# Patient Record
Sex: MALE | Race: WHITE | URBAN - METROPOLITAN AREA
[De-identification: names, ages, dates, MRNs, and addresses within clinical notes are randomized per-mention and may not be internally consistent; named-entity substitution may affect disease eponyms.]

---

## 2020-12-07 ENCOUNTER — OFFICE VISIT (OUTPATIENT)
Dept: URBAN - METROPOLITAN AREA CLINIC 126 | Facility: CLINIC | Age: 25
End: 2020-12-07
Payer: COMMERCIAL

## 2020-12-07 ENCOUNTER — WEB ENCOUNTER (OUTPATIENT)
Dept: URBAN - METROPOLITAN AREA CLINIC 126 | Facility: CLINIC | Age: 25
End: 2020-12-07

## 2020-12-07 DIAGNOSIS — R19.7 DIARRHEA: ICD-10-CM

## 2020-12-07 PROCEDURE — G8482 FLU IMMUNIZE ORDER/ADMIN: HCPCS | Performed by: INTERNAL MEDICINE

## 2020-12-07 PROCEDURE — 99203 OFFICE O/P NEW LOW 30 MIN: CPT | Performed by: INTERNAL MEDICINE

## 2020-12-07 RX ORDER — DICYCLOMINE HYDROCHLORIDE 20 MG/1
1 TABLET TABLET ORAL THREE TIMES A DAY
Qty: 90 | OUTPATIENT
Start: 2020-12-07 | End: 2021-01-06

## 2020-12-07 NOTE — HPI-TODAY'S VISIT:
goes by [facility]   for 5 yrs   abd cramps  5 stools a day  no blood  no family hx  wt down 8 pounds  very healthy eats well   goes to gym 5 times a week

## 2021-02-21 LAB
A/G RATIO: 1.8
ALBUMIN: 4.9
ALKALINE PHOSPHATASE: 122
ALT (SGPT): 31
AST (SGOT): 33
BASO (ABSOLUTE): 0.1
BASOS: 1
BILIRUBIN, TOTAL: 0.2
BUN/CREATININE RATIO: 18
BUN: 20
CALCIUM: 9.9
CARBON DIOXIDE, TOTAL: 23
CHLORIDE: 104
CREATININE: 1.09
DEAMIDATED GLIADIN ABS, IGA: 4
DEAMIDATED GLIADIN ABS, IGG: 5
EGFR IF AFRICN AM: 109
EGFR IF NONAFRICN AM: 94
ENDOMYSIAL ANTIBODY IGA: NEGATIVE
EOS (ABSOLUTE): 0.5
EOS: 6
GLOBULIN, TOTAL: 2.7
GLUCOSE: 96
HEMATOCRIT: 48
HEMATOLOGY COMMENTS:: (no result)
HEMOGLOBIN: 15.9
IMMATURE CELLS: (no result)
IMMATURE GRANS (ABS): 0
IMMATURE GRANULOCYTES: 0
IMMUNOGLOBULIN A, QN, SERUM: 128
LYMPHS (ABSOLUTE): 2.4
LYMPHS: 31
MCH: 29.6
MCHC: 33.1
MCV: 89
MONOCYTES(ABSOLUTE): 0.9
MONOCYTES: 11
NEUTROPHILS (ABSOLUTE): 4
NEUTROPHILS: 51
NRBC: (no result)
PLATELETS: 213
POTASSIUM: 4.1
PROTEIN, TOTAL: 7.6
RBC: 5.38
RDW: 12
SEDIMENTATION RATE-WESTERGREN: 15
SODIUM: 140
T-TRANSGLUTAMINASE (TTG) IGA: <2
T-TRANSGLUTAMINASE (TTG) IGG: 4
WBC: 7.9

## 2021-02-26 ENCOUNTER — LAB OUTSIDE AN ENCOUNTER (OUTPATIENT)
Dept: URBAN - METROPOLITAN AREA CLINIC 126 | Facility: CLINIC | Age: 26
End: 2021-02-26

## 2021-02-26 ENCOUNTER — OFFICE VISIT (OUTPATIENT)
Dept: URBAN - METROPOLITAN AREA CLINIC 126 | Facility: CLINIC | Age: 26
End: 2021-02-26
Payer: COMMERCIAL

## 2021-02-26 DIAGNOSIS — R19.7 DIARRHEA, UNSPECIFIED TYPE: ICD-10-CM

## 2021-02-26 DIAGNOSIS — R10.33 PERIUMBILICAL ABDOMINAL PAIN: ICD-10-CM

## 2021-02-26 PROBLEM — 443503005: Status: ACTIVE | Noted: 2021-02-26

## 2021-02-26 PROCEDURE — 99213 OFFICE O/P EST LOW 20 MIN: CPT | Performed by: INTERNAL MEDICINE

## 2021-02-26 NOTE — PHYSICAL EXAM GASTROINTESTINAL
Abdomen, soft, nontender, nondistended, no guarding or rigidity, no masses palpable, normal bowel sounds, Liver and Spleen, no hepatomegaly present, no hepatosplenomegaly, liver nontender, spleen not palpable, Self Exam: Abdomen soft, non-tender to palpatation, non-distended , Abdomen , soft, nontender, nondistended , no guarding or rigidity , no masses palpable , normal bowel sounds , Liver and Spleen , no hepatomegaly present , no hepatosplenomegaly , liver nontender , spleen not palpable

## 2021-02-26 NOTE — PHYSICAL EXAM CONSTITUTIONAL:
normal, alert, in no acute distress, well developed, well nourished, ambulating without difficulty, normal communication ability , well developed, well nourished , in no acute distress , ambulating without difficulty , normal communication ability

## 2021-02-26 NOTE — PHYSICAL EXAM CHEST:
no lesions, no deformities, no traumatic injuries, no significant scars are present, chest wall non-tender, no masses present, tactile fremitus is normal, breathing is unlabored without accessory muscle use., normal breath sounds. , no lesions,  no deformities,  no traumatic injuries,  no significant scars are present,  chest wall non-tender,  no masses present, breathing is unlabored without accessory muscle use, normal breath sounds

## 2021-02-26 NOTE — PHYSICAL EXAM NECK/THYROID:
normal appearance, without tenderness upon palpation, no deformities, no cervical lymphadenopathy, no masses, no thyroid nodules, Thyroid normal size, no JVD, thyroid nontender , normal appearance , without tenderness upon palpation , no deformities , trachea midline , Thyroid normal size , no thyroid nodules , no masses , no JVD , thyroid nontender

## 2021-02-26 NOTE — HPI-TODAY'S VISIT:
[facility] [employer]   works out and does fine there  but has had 4 bms already this am and more later  not liquid  in small pieces   watches diet  eats healthy  some wsxs sinc e high school  feels well now but sxs recur  need to rule out ibd  no hx in family

## 2021-03-25 ENCOUNTER — TELEPHONE ENCOUNTER (OUTPATIENT)
Dept: URBAN - METROPOLITAN AREA CLINIC 80 | Facility: CLINIC | Age: 26
End: 2021-03-25

## 2021-04-19 ENCOUNTER — OFFICE VISIT (OUTPATIENT)
Dept: URBAN - METROPOLITAN AREA SURGERY CENTER 19 | Facility: SURGERY CENTER | Age: 26
End: 2021-04-19

## 2021-04-26 ENCOUNTER — OFFICE VISIT (OUTPATIENT)
Dept: URBAN - METROPOLITAN AREA CLINIC 126 | Facility: CLINIC | Age: 26
End: 2021-04-26
Payer: COMMERCIAL

## 2021-04-26 DIAGNOSIS — K58.1 IRRITABLE BOWEL SYNDROME WITH CONSTIPATION: ICD-10-CM

## 2021-04-26 PROBLEM — 440630006: Status: ACTIVE | Noted: 2021-04-26

## 2021-04-26 PROCEDURE — 99213 OFFICE O/P EST LOW 20 MIN: CPT | Performed by: INTERNAL MEDICINE

## 2021-04-26 NOTE — HPI-TODAY'S VISIT:
feels terrible   pain in am  constipated  never evacuates fully   does not want a colon willing to try linzess

## 2021-08-20 ENCOUNTER — OFFICE VISIT (OUTPATIENT)
Dept: URBAN - METROPOLITAN AREA CLINIC 126 | Facility: CLINIC | Age: 26
End: 2021-08-20
Payer: COMMERCIAL

## 2021-08-20 DIAGNOSIS — R14.0 BLOATING SYMPTOM: ICD-10-CM

## 2021-08-20 PROCEDURE — 99213 OFFICE O/P EST LOW 20 MIN: CPT | Performed by: INTERNAL MEDICINE

## 2021-08-20 NOTE — PHYSICAL EXAM EYES:
Conjuntivae and eyelids appear normal, Sclerae : White without injection , Conjuntivae and eyelids appear normal, Sclerae : White without injection
Awake

## 2021-08-20 NOTE — HPI-TODAY'S VISIT:
bowels move normally  does not need linzess   feels ok but always bloated  wants to get food allergy testing

## 2022-02-18 ENCOUNTER — OFFICE VISIT (OUTPATIENT)
Dept: URBAN - METROPOLITAN AREA TELEHEALTH 2 | Facility: TELEHEALTH | Age: 27
End: 2022-02-18
Payer: COMMERCIAL

## 2022-02-18 VITALS — HEIGHT: 68 IN

## 2022-02-18 DIAGNOSIS — K92.1 BLOOD IN STOOL: ICD-10-CM

## 2022-02-18 DIAGNOSIS — R19.8 TENESMUS: ICD-10-CM

## 2022-02-18 DIAGNOSIS — R19.5 MUCUS IN STOOL: ICD-10-CM

## 2022-02-18 DIAGNOSIS — R14.0 BLOATING SYMPTOM: ICD-10-CM

## 2022-02-18 PROCEDURE — 99214 OFFICE O/P EST MOD 30 MIN: CPT | Performed by: INTERNAL MEDICINE

## 2022-02-18 NOTE — HPI-TODAY'S VISIT:
goes by [facility] dealing w/ GI issues lately few episodes with blood in stool, 2 weeks ago was the last episode been to a GI in [place], suggested endoscopic evaluation. was perscribed linzess for constiaption, did not help. at times get constipated, feels bloated flops from c/d. has daily stools, but some tenesmus mucus and blood. healthy, MVI. no longer taking stool softener. no famhx of IBD, celiac. try gluten free, carb free, egg free, has not helped. this has been going on for 7 years.  labs reviewed

## 2022-03-11 ENCOUNTER — OFFICE VISIT (OUTPATIENT)
Dept: URBAN - METROPOLITAN AREA SURGERY CENTER 19 | Facility: SURGERY CENTER | Age: 27
End: 2022-03-11
Payer: COMMERCIAL

## 2022-03-11 ENCOUNTER — CLAIMS CREATED FROM THE CLAIM WINDOW (OUTPATIENT)
Dept: URBAN - METROPOLITAN AREA CLINIC 4 | Facility: CLINIC | Age: 27
End: 2022-03-11
Payer: COMMERCIAL

## 2022-03-11 DIAGNOSIS — K63.89 SMALL BOWEL EDEMA: ICD-10-CM

## 2022-03-11 DIAGNOSIS — K92.1 ACUTE MELENA: ICD-10-CM

## 2022-03-11 DIAGNOSIS — K62.89 ACUTE PROCTITIS: ICD-10-CM

## 2022-03-11 PROCEDURE — G8907 PT DOC NO EVENTS ON DISCHARG: HCPCS | Performed by: INTERNAL MEDICINE

## 2022-03-11 PROCEDURE — 88305 TISSUE EXAM BY PATHOLOGIST: CPT | Performed by: PATHOLOGY

## 2022-03-11 PROCEDURE — 45331 SIGMOIDOSCOPY AND BIOPSY: CPT | Performed by: INTERNAL MEDICINE

## 2022-03-22 ENCOUNTER — TELEPHONE ENCOUNTER (OUTPATIENT)
Dept: URBAN - METROPOLITAN AREA CLINIC 96 | Facility: CLINIC | Age: 27
End: 2022-03-22

## 2023-06-09 ENCOUNTER — WEB ENCOUNTER (OUTPATIENT)
Dept: URBAN - METROPOLITAN AREA CLINIC 111 | Facility: CLINIC | Age: 28
End: 2023-06-09

## 2023-06-09 ENCOUNTER — OFFICE VISIT (OUTPATIENT)
Dept: URBAN - METROPOLITAN AREA CLINIC 111 | Facility: CLINIC | Age: 28
End: 2023-06-09
Payer: COMMERCIAL

## 2023-06-09 VITALS
HEART RATE: 56 BPM | SYSTOLIC BLOOD PRESSURE: 121 MMHG | BODY MASS INDEX: 25.31 KG/M2 | HEIGHT: 68 IN | WEIGHT: 167 LBS | DIASTOLIC BLOOD PRESSURE: 75 MMHG | TEMPERATURE: 98.1 F

## 2023-06-09 DIAGNOSIS — R19.8 ALTERNATING CONSTIPATION AND DIARRHEA: ICD-10-CM

## 2023-06-09 DIAGNOSIS — R10.9 ABDOMINAL CRAMPING: ICD-10-CM

## 2023-06-09 DIAGNOSIS — R14.0 BLOATING SYMPTOM: ICD-10-CM

## 2023-06-09 PROBLEM — 440544005: Status: ACTIVE | Noted: 2023-06-09

## 2023-06-09 PROCEDURE — 99213 OFFICE O/P EST LOW 20 MIN: CPT | Performed by: NURSE PRACTITIONER

## 2023-06-09 RX ORDER — L. ACIDOPHILUS,CASEI,RHAMNOSUS 50B CELL
AS DIRECTED CAPSULE,DELAYED RELEASE (ENTERIC COATED) ORAL
OUTPATIENT
Start: 2023-06-09

## 2023-06-09 RX ORDER — HYOSCYAMINE SULFATE 0.12 MG/1
1 TABLET UNDER THE TONGUE AND ALLOW TO DISSOLVE AS NEEDED TABLET, ORALLY DISINTEGRATING ORAL THREE TIMES A DAY
Qty: 30 | Refills: 1 | OUTPATIENT
Start: 2023-06-09 | End: 2023-06-29

## 2023-06-09 NOTE — HPI-TODAY'S VISIT:
29 yo male presents for follow up. Reports long history of abd bloating, alternating constipation and diarrheayarns Patient was last seen by Dr. Rg with few episodes of blood in stool, sigmoidoscopy 3/22 normal. He was seen by Dr. Lund few times since 2020. He eats healthy, exercises daily, takes Metamucil powder, tried dairy free, gluten free without relief. Has high stress at job currently. He didn't try low fodmap yet. Denies fever, chills, abd pain, nausea, vomiting or blood in stool. No fh IBD. Celiac panel negative.

## 2023-06-14 ENCOUNTER — TELEPHONE ENCOUNTER (OUTPATIENT)
Dept: URBAN - METROPOLITAN AREA CLINIC 12 | Facility: CLINIC | Age: 28
End: 2023-06-14

## 2023-06-22 ENCOUNTER — OFFICE VISIT (OUTPATIENT)
Dept: URBAN - METROPOLITAN AREA CLINIC 98 | Facility: CLINIC | Age: 28
End: 2023-06-22
Payer: COMMERCIAL

## 2023-06-22 ENCOUNTER — LAB OUTSIDE AN ENCOUNTER (OUTPATIENT)
Dept: URBAN - METROPOLITAN AREA CLINIC 98 | Facility: CLINIC | Age: 28
End: 2023-06-22

## 2023-06-22 ENCOUNTER — WEB ENCOUNTER (OUTPATIENT)
Dept: URBAN - METROPOLITAN AREA CLINIC 98 | Facility: CLINIC | Age: 28
End: 2023-06-22

## 2023-06-22 ENCOUNTER — DASHBOARD ENCOUNTERS (OUTPATIENT)
Age: 28
End: 2023-06-22

## 2023-06-22 VITALS
BODY MASS INDEX: 24.71 KG/M2 | SYSTOLIC BLOOD PRESSURE: 113 MMHG | DIASTOLIC BLOOD PRESSURE: 67 MMHG | WEIGHT: 163 LBS | HEART RATE: 64 BPM | HEIGHT: 68 IN | TEMPERATURE: 98.6 F

## 2023-06-22 DIAGNOSIS — R14.0 BLOATING SYMPTOM: ICD-10-CM

## 2023-06-22 DIAGNOSIS — R19.8 ALTERNATING CONSTIPATION AND DIARRHEA: ICD-10-CM

## 2023-06-22 DIAGNOSIS — R10.9 ABDOMINAL CRAMPING: ICD-10-CM

## 2023-06-22 PROCEDURE — 99214 OFFICE O/P EST MOD 30 MIN: CPT | Performed by: INTERNAL MEDICINE

## 2023-06-22 RX ORDER — L. ACIDOPHILUS,CASEI,RHAMNOSUS 50B CELL
AS DIRECTED CAPSULE,DELAYED RELEASE (ENTERIC COATED) ORAL
Status: ACTIVE | COMMUNITY
Start: 2023-06-09

## 2023-06-22 RX ORDER — HYOSCYAMINE SULFATE 0.12 MG/1
1 TABLET UNDER THE TONGUE AND ALLOW TO DISSOLVE AS NEEDED TABLET, ORALLY DISINTEGRATING ORAL THREE TIMES A DAY
Qty: 30 | Refills: 1 | Status: ACTIVE | COMMUNITY
Start: 2023-06-09 | End: 2023-06-29

## 2023-06-22 NOTE — HPI-TODAY'S VISIT:
Mr. Burroughs is a 27 yo M with h/o constipation/diarrhea.   Saw CAITLIN Dong NP on 6/9/23  Symptoms occur after eating.  Has a lot of pressure in his abdomen at times.  Levsin helped with his symptoms occasionally.  Metamucil/fiber gummies don't help much with his symptoms.  He tried Linzess 290 mcg a few years ago for 15 days- says it did not work.  Has 1 BM per day, feels it is incomplete. Tries going to the bathroom multiple times to get that last bit out- not successful.  Will get some cramping in his lower abdomen. Can sit on the toilet for 2 hours pushing to eliminate unsuccessfully.  I reviewed:  3/11/23 flex sig: some erythema 3/11/23 flex sig: normal biopsies 4/6/21 CT abdomen: diffuse stool

## 2023-06-28 ENCOUNTER — TELEPHONE ENCOUNTER (OUTPATIENT)
Dept: URBAN - METROPOLITAN AREA CLINIC 6 | Facility: CLINIC | Age: 28
End: 2023-06-28

## 2023-08-23 ENCOUNTER — OFFICE VISIT (OUTPATIENT)
Dept: URBAN - METROPOLITAN AREA CLINIC 98 | Facility: CLINIC | Age: 28
End: 2023-08-23

## 2024-07-01 ENCOUNTER — OFFICE VISIT (OUTPATIENT)
Dept: URBAN - METROPOLITAN AREA CLINIC 12 | Facility: CLINIC | Age: 29
End: 2024-07-01

## 2024-07-03 ENCOUNTER — OFFICE VISIT (OUTPATIENT)
Dept: URBAN - METROPOLITAN AREA CLINIC 111 | Facility: CLINIC | Age: 29
End: 2024-07-03
Payer: COMMERCIAL

## 2024-07-03 VITALS
HEART RATE: 49 BPM | TEMPERATURE: 97.7 F | SYSTOLIC BLOOD PRESSURE: 115 MMHG | HEIGHT: 68 IN | DIASTOLIC BLOOD PRESSURE: 75 MMHG | BODY MASS INDEX: 24.64 KG/M2 | WEIGHT: 162.6 LBS

## 2024-07-03 DIAGNOSIS — K58.2 IRRITABLE BOWEL SYNDROME WITH MIXED BOWEL HABITS: ICD-10-CM

## 2024-07-03 DIAGNOSIS — R14.0 BLOATING AND GAS: ICD-10-CM

## 2024-07-03 PROCEDURE — 99213 OFFICE O/P EST LOW 20 MIN: CPT | Performed by: PHYSICIAN ASSISTANT

## 2024-07-03 RX ORDER — L. ACIDOPHILUS,CASEI,RHAMNOSUS 50B CELL
AS DIRECTED CAPSULE,DELAYED RELEASE (ENTERIC COATED) ORAL
Status: ON HOLD | COMMUNITY
Start: 2023-06-09

## 2024-07-03 NOTE — HPI-TODAY'S VISIT:
30 y/o male here with IBS. Seen 1 year ago by Dr. Delgado for bloating, cramping, and alternating constipation and diarrhea. Pt failed Linzess 290 mcg but was noted to be having a large stool daily. He had SIBO test and anorectal manometry ordered. S/p flex sig by Dr. Escalera in 3/2022 for hematochezia revealing erythematous mucosa in rectum and internal hemorrhoids. Path unimpressive. Celiac negative in the past.  Pt reports a lot of gas and discomfort. He can see undigested food in his stool recently. He is having multiple stools a day and this has been the case for a long time. Stools can be small or loose. He can feel bloated and sit on the toilet for a while with nothing coming out but gas. He occasionally sees blood with wiping. He will sometimes strain. Sometimes has generalized abdominal pain. Sunday he woke up during the night and had to use the bathroom. This does not happen a lot but it can. He had chicken wings which can be a trigger for him. Ramen and sushi can be an issue for him as well. He does not think gluten or dairy specifically cause issues. No weight loss. No FH of GI cancer. Antispasmodic did not help in the past. Gas-x does not help. No previous colonoscopy.

## 2024-07-13 ENCOUNTER — LAB OUTSIDE AN ENCOUNTER (OUTPATIENT)
Dept: URBAN - METROPOLITAN AREA CLINIC 23 | Facility: CLINIC | Age: 29
End: 2024-07-13

## 2024-07-18 LAB
A/G RATIO: 1.8
ADENOVIRUS F 40/41: NOT DETECTED
ALBUMIN: 4.8
ALKALINE PHOSPHATASE: 75
ALT (SGPT): 16
AST (SGOT): 23
BILIRUBIN, TOTAL: 0.7
BUN/CREATININE RATIO: (no result)
BUN: 18
CALCIUM: 9.4
CALPROTECTIN, STOOL - QDX: (no result)
CAMPYLOBACTER: NOT DETECTED
CARBON DIOXIDE, TOTAL: 28
CHLORIDE: 105
CLOSTRIDIUM DIFFICILE: NOT DETECTED
CREATININE: 1.02
EGFR: 103
ENTAMOEBA HISTOLYTICA: NOT DETECTED
ENTEROAGGREGATIVE E.COLI: NOT DETECTED
ENTEROTOXIGENIC E.COLI: NOT DETECTED
ESCHERICHIA COLI O157: NOT DETECTED
GIARDIA LAMBLIA: NOT DETECTED
GLOBULIN, TOTAL: 2.6
GLUCOSE: 88
HEMATOCRIT: 45.9
HEMOGLOBIN: 15.5
MCH: 29.1
MCHC: 33.8
MCV: 86.1
MPV: 12.6
NOROVIRUS GI/GII: NOT DETECTED
PANCREATICELASTASE ELISA, STOOL: (no result)
PLATELET COUNT: 198
POTASSIUM: 4.4
PROTEIN, TOTAL: 7.4
RDW: 12
RED BLOOD CELL COUNT: 5.33
ROTAVIRUS A: NOT DETECTED
SALMONELLA SPP.: NOT DETECTED
SHIGA-LIKE TOXIN PRODUCING E.COLI: NOT DETECTED
SHIGELLA SPP. / ENTEROINVASIVE E.COLI: NOT DETECTED
SODIUM: 140
VIBRIO PARAHAEMOLYTICUS: NOT DETECTED
VIBRIO SPP.: NOT DETECTED
WHITE BLOOD CELL COUNT: 5.2
YERSINIA ENTEROCOLITICA: NOT DETECTED

## 2024-07-24 ENCOUNTER — OFFICE VISIT (OUTPATIENT)
Dept: URBAN - METROPOLITAN AREA CLINIC 111 | Facility: CLINIC | Age: 29
End: 2024-07-24
Payer: COMMERCIAL

## 2024-07-24 VITALS
DIASTOLIC BLOOD PRESSURE: 66 MMHG | HEART RATE: 48 BPM | BODY MASS INDEX: 25.22 KG/M2 | TEMPERATURE: 98.1 F | HEIGHT: 68 IN | WEIGHT: 166.4 LBS | SYSTOLIC BLOOD PRESSURE: 109 MMHG

## 2024-07-24 DIAGNOSIS — R14.0 BLOATING AND GAS: ICD-10-CM

## 2024-07-24 DIAGNOSIS — K58.8 OTHER IRRITABLE BOWEL SYNDROME: ICD-10-CM

## 2024-07-24 PROCEDURE — 99213 OFFICE O/P EST LOW 20 MIN: CPT | Performed by: PHYSICIAN ASSISTANT

## 2024-07-24 RX ORDER — L. ACIDOPHILUS,CASEI,RHAMNOSUS 50B CELL
AS DIRECTED CAPSULE,DELAYED RELEASE (ENTERIC COATED) ORAL
Status: ON HOLD | COMMUNITY
Start: 2023-06-09

## 2024-07-24 NOTE — HPI-TODAY'S VISIT:
28 y/o male here to follow up. Pt seen by me on 7/3 for IBS, bloating/gas, and loose stools. Stool tests and labs were ordered. Labs all normal. Stool studies, calprotectin, and pancreatic elastase all normal. Pt was also told to try IBgard.   S/p flex sig by Dr. Escalera in 3/2022 for hematochezia revealing erythematous mucosa in rectum and internal hemorrhoids. Path unimpressive. Celiac negative in the past.  Pt has been having a lot of gas and bloating. No further loose stool. He feels constipated now. He last had a stool this morning but it was small. He does not feel emptied out. He has never tried Miralax. Occasionally takes psyllium husk. He somewhat follows the low fodmap diet.   Previous: Pt reports a lot of gas and discomfort. He can see undigested food in his stool recently. He is having multiple stools a day and this has been the case for a long time. Stools can be small or loose. He can feel bloated and sit on the toilet for a while with nothing coming out but gas. He occasionally sees blood with wiping. He will sometimes strain. Sometimes has generalized abdominal pain. Sunday he woke up during the night and had to use the bathroom. This does not happen a lot but it can. He had chicken wings which can be a trigger for him. Ramen and sushi can be an issue for him as well. He does not think gluten or dairy specifically cause issues. No weight loss. No FH of GI cancer. Antispasmodic did not help in the past. Gas-x does not help. No previous colonoscopy.

## 2024-08-22 ENCOUNTER — OFFICE VISIT (OUTPATIENT)
Dept: URBAN - METROPOLITAN AREA CLINIC 111 | Facility: CLINIC | Age: 29
End: 2024-08-22
Payer: COMMERCIAL

## 2024-08-22 VITALS
TEMPERATURE: 98.1 F | SYSTOLIC BLOOD PRESSURE: 109 MMHG | BODY MASS INDEX: 24.67 KG/M2 | HEIGHT: 68 IN | HEART RATE: 64 BPM | DIASTOLIC BLOOD PRESSURE: 68 MMHG | WEIGHT: 162.8 LBS

## 2024-08-22 DIAGNOSIS — K58.8 OTHER IRRITABLE BOWEL SYNDROME: ICD-10-CM

## 2024-08-22 DIAGNOSIS — F41.9 ANXIETY: ICD-10-CM

## 2024-08-22 DIAGNOSIS — R14.0 BLOATING AND GAS: ICD-10-CM

## 2024-08-22 PROCEDURE — 99213 OFFICE O/P EST LOW 20 MIN: CPT | Performed by: PHYSICIAN ASSISTANT

## 2024-08-22 RX ORDER — L. ACIDOPHILUS,CASEI,RHAMNOSUS 50B CELL
AS DIRECTED CAPSULE,DELAYED RELEASE (ENTERIC COATED) ORAL
Status: ON HOLD | COMMUNITY
Start: 2023-06-09

## 2024-08-22 NOTE — HPI-TODAY'S VISIT:
28 y/o male here to follow up. Pt seen by me last on 7/24 for IBS and bloating/gas. He was complaining more of constipation and told to start miralax daily. He was told he can do every other day if that works better. He was also told to try phazyme and given low fodmap diet info.  Pt tried increased simethicone which helped a little. He tried miralax for a week and had a lot of cramping. It did not help much regarding constipation. He also tried IBgard which did not help bloating. He started Creatine 3 weeks ago but does not think it is worsening any bowel issues. He does not have as many issues with stooling or gas on the weekends. He  states he has stress and anxiety during the work week. He is getting enough sleep and exercising regularly. He also states he eats relatively clean. He is stooling daily but it is pieces of stool and not a "normal" stool. This bothers him.   Previous: Labs all normal. Stool studies, calprotectin, and pancreatic elastase all normal. Pt was also told to try IBgard. S/p flex sig by Dr. Escalera in 3/2022 for hematochezia revealing erythematous mucosa in rectum and internal hemorrhoids. Path unimpressive. Celiac negative in the past. He does not feel emptied out. He has never tried Miralax. Occasionally takes psyllium husk. He somewhat follows the low fodmap diet. Stools can be small or loose. He can feel bloated and sit on the toilet for a while with nothing coming out but gas. He occasionally sees blood with wiping. He will sometimes strain. Sometimes has generalized abdominal pain. No weight loss. No FH of GI cancer. Antispasmodic did not help in the past. Gas-x does not help. No previous colonoscopy.

## 2024-10-01 ENCOUNTER — OFFICE VISIT (OUTPATIENT)
Dept: URBAN - METROPOLITAN AREA CLINIC 111 | Facility: CLINIC | Age: 29
End: 2024-10-01
Payer: COMMERCIAL

## 2024-10-01 ENCOUNTER — LAB OUTSIDE AN ENCOUNTER (OUTPATIENT)
Dept: URBAN - METROPOLITAN AREA CLINIC 111 | Facility: CLINIC | Age: 29
End: 2024-10-01

## 2024-10-01 VITALS
WEIGHT: 165 LBS | SYSTOLIC BLOOD PRESSURE: 97 MMHG | HEART RATE: 58 BPM | DIASTOLIC BLOOD PRESSURE: 61 MMHG | HEIGHT: 68 IN | BODY MASS INDEX: 25.01 KG/M2 | TEMPERATURE: 97.9 F

## 2024-10-01 DIAGNOSIS — K64.9 HEMORRHOID: ICD-10-CM

## 2024-10-01 DIAGNOSIS — R58 BLOOD ON TOILET PAPER: ICD-10-CM

## 2024-10-01 DIAGNOSIS — R14.0 BLOATING AND GAS: ICD-10-CM

## 2024-10-01 DIAGNOSIS — R19.5 DECREASED STOOL CALIBER: ICD-10-CM

## 2024-10-01 DIAGNOSIS — F41.9 ANXIETY: ICD-10-CM

## 2024-10-01 DIAGNOSIS — R19.8 ALTERNATING CONSTIPATION AND DIARRHEA: ICD-10-CM

## 2024-10-01 DIAGNOSIS — K58.9 IRRITABLE BOWEL SYNDROME: ICD-10-CM

## 2024-10-01 PROCEDURE — 99214 OFFICE O/P EST MOD 30 MIN: CPT | Performed by: PHYSICIAN ASSISTANT

## 2024-10-01 RX ORDER — L. ACIDOPHILUS,CASEI,RHAMNOSUS 50B CELL
AS DIRECTED CAPSULE,DELAYED RELEASE (ENTERIC COATED) ORAL
Status: ON HOLD | COMMUNITY
Start: 2023-06-09

## 2024-10-01 NOTE — HPI-TODAY'S VISIT:
30 y/o male with IBS here with ongoing symptoms. Last seen by me on 8/22. Pt had tried OTC meds (IBgard & miralax) without much relief. Pt was told anxiety may be contributing a lot to symptoms which he agreed with. He reported having more issues with stools during the week while working than on the weekends. He was recommended to see a PCP and consider starting medication for anxiety.   He went on Lexapro about 1 month ago and has not noticed significant improvement. It did help him mentally but not as much with stools. He has been off of it the last 2 days and has not picked up a refill. He also now has a hemorrhoid that has been bothering him. He is spending over an hour a day stooling on average d/t not being able to evacuate entirely at once.  Previous: He  states he has stress and anxiety during the work week. He is getting enough sleep and exercising regularly. He also states he eats relatively clean. He is stooling daily but it is pieces of stool and not a "normal" stool. This bothers him. Labs all normal. Stool studies, calprotectin, and pancreatic elastase all normal. S/p flex sig by Dr. Escalera in 3/2022 for hematochezia revealing erythematous mucosa in rectum and internal hemorrhoids. Path unimpressive. Celiac negative in the past. He somewhat follows the low fodmap diet. Stools can be small or loose. He can feel bloated and sit on the toilet for a while with nothing coming out but gas. He occasionally sees blood with wiping. He will sometimes strain. Sometimes has generalized abdominal pain. No weight loss. No FH of GI cancer. Antispasmodic did not help in the past. Gas-x does not help. No previous colonoscopy.

## 2024-11-22 ENCOUNTER — OFFICE VISIT (OUTPATIENT)
Dept: URBAN - METROPOLITAN AREA LAB 3 | Facility: LAB | Age: 29
End: 2024-11-22